# Patient Record
Sex: FEMALE | URBAN - METROPOLITAN AREA
[De-identification: names, ages, dates, MRNs, and addresses within clinical notes are randomized per-mention and may not be internally consistent; named-entity substitution may affect disease eponyms.]

---

## 2024-10-18 ENCOUNTER — TELEPHONE (OUTPATIENT)
Dept: OBSTETRICS AND GYNECOLOGY | Facility: CLINIC | Age: 74
End: 2024-10-18
Payer: MEDICARE

## 2024-10-18 NOTE — TELEPHONE ENCOUNTER
----- Message from Linn sent at 10/18/2024 11:10 AM CDT -----  Contact: Jyothi Roe is calling to see if the office will take her on as a new medicare patient at her age for off and on vaginal bleeding, please call her at  [phone].    Thanks  Td

## 2024-10-18 NOTE — TELEPHONE ENCOUNTER
Returned patient's call. Phone call was disconnected.     [facility] does accept patient's insurance but is currently out on medical leave and doesn't have any available appointments until January. [facility] would not be able to manage patient for PMB. Patient would need an ultrasound and possible EMB, therefore would need to see a Physician.   Dr. Gallagher is not accepting new Medicare patients. Pt can try seeing Dr. Chandler here at [facility] or another provider at [facility] or [facility].

## 2024-10-18 NOTE — TELEPHONE ENCOUNTER
Called and spoke with the patient. She reports having vaginal bleeding off/on. Her PCP ordered and US - she has the report. Pt states she had a total hysterectomy and is not on hormone therapy.     Dr. Gallagher is not accepting new medicare and [facility] doesn't have anything until January.   I adv pt I would see if another provider would see her. Pt is aware and verbalized understanding.  Angeles Aguirre

## 2024-10-18 NOTE — TELEPHONE ENCOUNTER
----- Message from Maryann sent at 10/18/2024 12:24 PM CDT -----  Type:  Patient Returning Call    Who Called:Jyothi Ghotra    Who Left Message for Patient: Shey   Does the patient know what this is regarding?:Abdulkadirf appt   Would the patient rather a call back or a response via [facility]?    Best Call Back Number:[phone]    Additional Information: RTc

## 2024-10-23 ENCOUNTER — OFFICE VISIT (OUTPATIENT)
Dept: OBSTETRICS AND GYNECOLOGY | Facility: CLINIC | Age: 74
End: 2024-10-23
Payer: MEDICARE

## 2024-10-23 VITALS — SYSTOLIC BLOOD PRESSURE: 135 MMHG | HEART RATE: 64 BPM | WEIGHT: 163.19 LBS | DIASTOLIC BLOOD PRESSURE: 81 MMHG

## 2024-10-23 DIAGNOSIS — Z12.4 SCREENING FOR MALIGNANT NEOPLASM OF THE CERVIX: Primary | ICD-10-CM

## 2024-10-23 DIAGNOSIS — N95.0 POSTMENOPAUSAL BLEEDING: ICD-10-CM

## 2024-10-23 DIAGNOSIS — N89.5 VAGINAL STENOSIS: ICD-10-CM

## 2024-10-23 PROCEDURE — 3075F SYST BP GE 130 - 139MM HG: CPT | Mod: CPTII,,, | Performed by: OBSTETRICS & GYNECOLOGY

## 2024-10-23 PROCEDURE — 99202 OFFICE O/P NEW SF 15 MIN: CPT | Mod: S$PBB,,, | Performed by: OBSTETRICS & GYNECOLOGY

## 2024-10-23 PROCEDURE — 3079F DIAST BP 80-89 MM HG: CPT | Mod: CPTII,,, | Performed by: OBSTETRICS & GYNECOLOGY

## 2024-10-23 PROCEDURE — 4010F ACE/ARB THERAPY RXD/TAKEN: CPT | Mod: CPTII,,, | Performed by: OBSTETRICS & GYNECOLOGY

## 2024-10-23 RX ORDER — CARVEDILOL 6.25 MG/1
6.25 TABLET ORAL
COMMUNITY

## 2024-10-23 RX ORDER — ERGOCALCIFEROL 1.25 MG/1
CAPSULE ORAL
COMMUNITY

## 2024-10-23 RX ORDER — DICLOFENAC SODIUM 75 MG/1
TABLET, DELAYED RELEASE ORAL
COMMUNITY

## 2024-10-23 RX ORDER — HYDROCHLOROTHIAZIDE 25 MG/1
1 TABLET ORAL EVERY MORNING
COMMUNITY

## 2024-10-23 RX ORDER — CEPHALEXIN 500 MG/1
500 CAPSULE ORAL
COMMUNITY
Start: 2024-10-21 | End: 2024-10-26

## 2024-10-23 RX ORDER — OXYBUTYNIN CHLORIDE 5 MG/1
TABLET ORAL
COMMUNITY

## 2024-10-23 RX ORDER — GABAPENTIN 100 MG/1
100 CAPSULE ORAL
COMMUNITY

## 2024-10-23 RX ORDER — FAMOTIDINE 40 MG/1
1 TABLET, FILM COATED ORAL 2 TIMES DAILY
COMMUNITY
Start: 2024-10-21

## 2024-10-23 RX ORDER — FLUTICASONE PROPIONATE 50 MCG
SPRAY, SUSPENSION (ML) NASAL
COMMUNITY
Start: 2024-10-21

## 2024-10-23 RX ORDER — CITALOPRAM 40 MG/1
1 TABLET, FILM COATED ORAL EVERY MORNING
COMMUNITY

## 2024-10-23 RX ORDER — POTASSIUM CHLORIDE 1500 MG/1
20 TABLET, EXTENDED RELEASE ORAL
COMMUNITY
Start: 2024-07-26

## 2024-10-23 RX ORDER — CLOPIDOGREL BISULFATE 75 MG/1
1 TABLET ORAL EVERY MORNING
COMMUNITY

## 2024-10-23 NOTE — PROGRESS NOTES
S a 74-year-old female with a history of postmenopausal bleeding said she has had bleeding often on for years sometimes this spotting sometimes is little clots by history she has had a complete hysterectomy said tubes and ovaries for pelvic pain periods .She relates no history of an abnormal Pap.  She has had 2 colonoscopies in the past several years.\She is not sure what the last one was for.  On examination the vulva is normal the upper vaginal vault is stenosed and very friable and firm bimanual does feel like this is possible mass in his area periods this is very worrisome for a vaginal carcinoma.  Will await the Pap smear and I think she will probably need to be seen by gyn Oncology periods did spend 15 minutes discussing the patient's history and examined the patient

## 2024-10-31 ENCOUNTER — TELEPHONE (OUTPATIENT)
Dept: OBSTETRICS AND GYNECOLOGY | Facility: CLINIC | Age: 74
End: 2024-10-31
Payer: MEDICARE

## 2024-10-31 DIAGNOSIS — Z12.31 OTHER SCREENING MAMMOGRAM: Primary | ICD-10-CM

## 2024-11-27 ENCOUNTER — TELEPHONE (OUTPATIENT)
Dept: GYNECOLOGIC ONCOLOGY | Facility: CLINIC | Age: 74
End: 2024-11-27
Payer: MEDICARE

## 2024-11-27 ENCOUNTER — OFFICE VISIT (OUTPATIENT)
Dept: OBSTETRICS AND GYNECOLOGY | Facility: CLINIC | Age: 74
End: 2024-11-27
Payer: MEDICARE

## 2024-11-27 VITALS — HEART RATE: 64 BPM | DIASTOLIC BLOOD PRESSURE: 82 MMHG | WEIGHT: 166.19 LBS | SYSTOLIC BLOOD PRESSURE: 132 MMHG

## 2024-11-27 DIAGNOSIS — N95.0 POSTMENOPAUSAL BLEEDING: Primary | ICD-10-CM

## 2024-11-27 PROCEDURE — 1159F MED LIST DOCD IN RCRD: CPT | Mod: CPTII,,, | Performed by: OBSTETRICS & GYNECOLOGY

## 2024-11-27 PROCEDURE — 3079F DIAST BP 80-89 MM HG: CPT | Mod: CPTII,,, | Performed by: OBSTETRICS & GYNECOLOGY

## 2024-11-27 PROCEDURE — 4010F ACE/ARB THERAPY RXD/TAKEN: CPT | Mod: CPTII,,, | Performed by: OBSTETRICS & GYNECOLOGY

## 2024-11-27 PROCEDURE — 3075F SYST BP GE 130 - 139MM HG: CPT | Mod: CPTII,,, | Performed by: OBSTETRICS & GYNECOLOGY

## 2024-11-27 PROCEDURE — 99213 OFFICE O/P EST LOW 20 MIN: CPT | Mod: S$PBB,,, | Performed by: OBSTETRICS & GYNECOLOGY

## 2024-11-27 NOTE — NURSING
New referral received from Dr Chandler for a second opinion regarding pt postmenopausal bleeding with GYN/ONC. LVM with for a call back to arrange new pt appointment. Direct navigator phone number provided to pt [phone]    Oncology Navigation   Intake  Cancer Type: Gynecologic (second opinion on PMB)  Type of Referral: Internal (Dr Wilmer Chandler)  Date of Referral: 11/27/24  Initial Nurse Navigator Contact: 11/27/24  Referral to Initial Contact Timeline (days): 0  Reason if booked > 7 days after scheduling: Transportation coordination; Specific provider / access; Patient request     Treatment  Current Status: Staging work-up     Providers:  PCP- Dr Reji Cuenca  OBGYN- Dr Wilmer Chandler  Pulmonary- Dr Moises Donahue  Cardiology- Dr Perales/ Summer Hooks FNP    Medical History:  BMI= 33.37  Restrictive lung disease  CAD- prior stents x2  Supplemental O2 during sleep  HTN  Hysterectomy- complete tubes and ovaries for painful periods  Hypothyroidism  GERD  Overactive bladder  Restless leg syndrome    10/23/24- new pt visit with Dr Chandler. PMB for years. Vulva is normal the upper vaginal vault is stenosed and very friable and firm bimanual does feel like this is possible mass in this area. This is very worrisome for a vaginal carcinoma. Will await the Pap smear and I think she will probably need to be seen by gyn Oncology.  Pap= atypical squamous cells of undetermined significance  PHV- negative    11/27/24- Dr Chandler visit- Vagina is atrophic however the apex is with the bleeding is coming from (the cuff) tried to cleaned it I will going to try her on some Estrace 1 g vaginally every night I will get a 2nd opinion from a gyn oncologist and we may just have to see if this will clear up. She does have some firmness in the apex around the vagina     Acuity      Follow Up  No follow-ups on file.

## 2024-11-27 NOTE — PROGRESS NOTES
Subjective     Patient ID: Jyothi Ghotra is a 74 y.o. female.    Chief Complaint:  Vaginal Bleeding      History of Present Illness  A 74-year-old female here for annual examination she has no complaints she does have a vulva lesion has not changed for 4-5 years has been followed by Dr. Ireland he felt it was benign.  Does have a history of high blood pressure reflux and bladder problems    Vaginal Bleeding  The patient's pertinent negatives include no pelvic pain or vaginal discharge. Pertinent negatives include no abdominal pain, chills, constipation, diarrhea, dysuria, fever, hematuria, nausea, rash or vomiting. There is no history of menorrhagia.     {OBG HPI BLOCKS:69485}    GYN & OB History  No LMP recorded.   Date of Last Pap: 10/30/2024    OB History    Para Term  AB Living   3             SAB IAB Ectopic Multiple Live Births           3      # Outcome Date GA Lbr Jose/2nd Weight Sex Type Anes PTL Lv   3       Vag-Spont      2       Vag-Spont      1       Vag-Spont          Review of Systems  Review of Systems   Constitutional:  Negative for chills and fever.   Respiratory:  Negative for shortness of breath.    Cardiovascular:  Negative for chest pain.   Gastrointestinal:  Negative for abdominal pain, blood in stool, constipation, diarrhea, nausea, vomiting and reflux.   Genitourinary:  Positive for vaginal bleeding. Negative for dysmenorrhea, dyspareunia, dysuria, hematuria, hot flashes, menorrhagia, menstrual problem, pelvic pain, vaginal discharge, postcoital bleeding and vaginal dryness.   Musculoskeletal:  Negative for arthralgias and joint swelling.   Integumentary:  Negative for rash, hair changes, breast mass, nipple discharge and breast skin changes.   Psychiatric/Behavioral:  Negative for depression. The patient is not nervous/anxious.    Breast: Negative for asymmetry, lump, mass, nipple discharge and skin changes         Objective   Physical Exam:    Constitutional: She appears well-developed and well-nourished. No distress.    HENT:   Head: Normocephalic and atraumatic.    Eyes: Conjunctivae and EOM are normal.    Neck: No tracheal deviation present. No thyromegaly present.    Cardiovascular:       Exam reveals no clubbing, no cyanosis and no edema.        Pulmonary/Chest: Effort normal. No respiratory distress.        Abdominal: Soft. She exhibits no distension and no mass. There is no abdominal tenderness. There is no rebound and no guarding. No hernia.     Genitourinary:    Vagina and rectum normal.      Pelvic exam was performed with patient supine.   There is no rash, tenderness, lesion or injury on the right labia. There is no rash, tenderness, lesion or injury on the left labia. Cervix is normal. Right adnexum displays no mass, no tenderness and no fullness. Left adnexum displays no mass, no tenderness and no fullness.    Genitourinary Comments: Vulva vagina bimanual normal breast exam normal does have a small little cyst off the inner vaginal wall on her right just inside the introitus apparently has not changed in years bimanual normal                  Skin: She is not diaphoretic. No cyanosis. Nails show no clubbing.             Assessment and Plan     1. Postmenopausal bleeding       ***      Plan:  ***

## 2024-11-27 NOTE — PROGRESS NOTES
A 74-year-old female who was having some postmenopausal bleeding on examination she has apex which is where the bleeding is coming from she had a complete hysterectomy which she says was just for multiple female problems she she states she had no history of abnormal Pap smears on examination the vagina is atrophic however the apex is with the bleeding is coming from (the cuff) tried to cleaned it I will going to try her on some Estrace 1 g vaginally every night I will get a 2nd opinion from a gyn oncologist and we may just have to see if this will clear up S she does have some firmness in the apex around the vagina also spent 20 minutes examined in the patient getting a history from her and explaining the reason for referral

## 2024-11-27 NOTE — NURSING
Pt contacted navigator back from missed call. Pt updated that a new referral was received from Dr Chandler for a second opinion regarding pt postmenopausal bleeding. Explained my role as nurse navigator and direct number provided. Options for GYN/ONC providers, all clinic locations and soonest availability discussed with pt. Pt scheduled to see Dr Rony Montero in the next available appointment. Patient encouraged to call for any questions or concerns about her care or appointments. Pt verbalized understanding. Date time and location of appointment reviewed with pt. Appointment letter mailed to pt.

## 2024-12-02 ENCOUNTER — TELEPHONE (OUTPATIENT)
Dept: OBSTETRICS AND GYNECOLOGY | Facility: CLINIC | Age: 74
End: 2024-12-02
Payer: MEDICARE

## 2024-12-02 NOTE — TELEPHONE ENCOUNTER
----- Message from Denita sent at 12/2/2024 12:20 PM CST -----  Contact: self  Patient is requesting a call back regarding medication being called out. Please call back at [phone]

## 2024-12-03 RX ORDER — ESTRADIOL 0.1 MG/G
1 CREAM VAGINAL DAILY
Qty: 42.5 G | Refills: 1 | Status: SHIPPED | OUTPATIENT
Start: 2024-12-03 | End: 2025-12-03

## 2024-12-11 NOTE — PROGRESS NOTES
Referring provider:   Dr Wilmer Chandler   PCP- Dr Reji Cuenca  Pulmonary- Dr Moises Donahue  Cardiology- Dr Perales/ Summer Hooks FNP  Subjective:      Patient ID: Jyothi Ghotra is a 74 y.o. female.    Chief Complaint:  NEW PATIENT - NP:Geraldine, second opinion on PMB      HPI  Here today for second opinion on PMB. Patient presented in 10/2024 with c/o PMB for several years. Pelvic exam at this time concerning for possible vaginal carcinoma with 'stenotic upper vagina, friable and firm concerning for possible mass'. Pap smear obtained at this time showed ASCUS. She was started on vaginal estrogen and referred to gyn/onc. PMHx restrictive lung dz (supp oxygen during sleep), CAD (stent x2, plavix), HTN. Prior hysterectomy-BSO  (abdominal hysterectomy 'painful periods'), cholecystectomy and appendectomy (open)     Review of Systems   Constitutional:  Negative for activity change, fatigue, fever and unexpected weight change.   Respiratory:  Negative for cough and shortness of breath.    Cardiovascular:  Negative for chest pain.   Gastrointestinal:  Negative for abdominal distention, abdominal pain, constipation and diarrhea.   Genitourinary:  Negative for difficulty urinating, dysuria, frequency, pelvic pain, urgency, vaginal bleeding and vaginal pain.   Neurological:  Negative for dizziness and weakness.       Past Medical History:   Diagnosis Date    Anxiety disorder, unspecified     Bladder incontinence     Constipation     Essential (primary) hypertension     Heart disease     High cholesterol     IBS (irritable bowel syndrome)     Rheumatoid arthritis, unspecified     Thyroid disease     Thyroid disease      Past Surgical History:   Procedure Laterality Date    BACK SURGERY      HIP ADDUCTOR TENOTOMY      hysterectomy complete      stints       TOTAL KNEE REPLACEMENT USING COMPUTER NAVIGATION       Family History   Problem Relation Name Age of Onset    Hypertension Father      Diabetes Father       Heart disease Mother      Heart disease Brother      Diabetes Brother       Social History     Socioeconomic History    Marital status: Unknown   Tobacco Use    Smoking status: Never    Smokeless tobacco: Never   Substance and Sexual Activity    Alcohol use: Never    Drug use: Never    Sexual activity: Not Currently     Social Drivers of Health     Financial Resource Strain: Low Risk  (10/30/2024)    [facility]    Overall Financial Resource Strain (CARDIA)     Difficulty of Paying Living Expenses: Not hard at all   Food Insecurity: No Food Insecurity (10/30/2024)    [facility]    Hunger Vital Sign     Worried About Running Out of Food in the Last Year: Never true     Ran Out of Food in the Last Year: Never true   Transportation Needs: No Transportation Needs (10/30/2024)    [facility]    PRAPARE - Transportation     Lack of Transportation (Medical): No     Lack of Transportation (Non-Medical): No   Physical Activity: Sufficiently Active (10/30/2024)    [facility]    Exercise Vital Sign     Days of Exercise per Week: 7 days     Minutes of Exercise per Session: 30 min   Stress: No Stress Concern Present (10/30/2024)    [facility]    [place] [employer] - Occupational Stress Questionnaire     Feeling of Stress : Only a little   Housing Stability: Low Risk  (10/30/2024)    [facility]    Housing Stability Vital Sign     Unable to Pay for Housing in the Last Year: No     Number  of Times Moved in the Last Year: 0     Homeless in the Last Year: No     Current Outpatient Medications   Medication Sig    carvediloL (COREG) 6.25 MG tablet Take 6.25 mg by mouth.    citalopram (CELEXA) 40 MG tablet Take 1 tablet by mouth every morning.    clopidogreL (PLAVIX) 75 mg tablet Take 1 tablet by mouth every morning.    diclofenac (VOLTAREN) 75 MG EC tablet     ergocalciferol (ERGOCALCIFEROL) 50,000 unit Cap     estradioL (ESTRACE) 0.01 % (0.1 mg/gram) vaginal cream Place 1 g vaginally once daily.    famotidine (PEPCID) 40 MG tablet Take 1 tablet by mouth 2 (two) times daily.    fluticasone propionate (FLONASE) 50 mcg/actuation nasal spray USE 2 SPRAYS INTO EACH NOSTRIL ONCE DAILY    gabapentin (NEURONTIN) 100 MG capsule Take 100 mg by mouth.    hydroCHLOROthiazide (HYDRODIURIL) 25 MG tablet Take 1 tablet by mouth every morning.    oxybutynin (DITROPAN) 5 MG Tab     potassium chloride (K-TAB) 20 mEq Take 20 mEq by mouth.     No current facility-administered medications for this visit.     Review of patient's allergies indicates:   Allergen Reactions    Albuterol     Oxycodone-aspirin Rash       Objective:   Physical Exam:   Constitutional: She is oriented to person, place, and time. She appears well-developed.        Pulmonary/Chest: Effort normal and breath sounds normal. No respiratory distress.        Abdominal: Soft. She exhibits no distension. There is no abdominal tenderness.     Genitourinary:    Genitourinary Comments: No palpable inguinal lymphadenopathy.  External genitalia normal. Stenotic vaginal at the upper 1/2 of vaginal vault. Agglutination gentle dilated with single digit. Tissue indurated and friable, but no maribel tumor or mass appreciated.   Biopsies performed with tischler forceps.                 Neurological: She is alert and oriented to person, place, and time.    Skin: No rash noted.    Psychiatric: She has a normal mood and affect. Judgment normal.       Assessment:     1.  Postmenopausal bleeding        Plan:       Post menopausal bleeding: Exam concerning for possible primary vaginal cancer, though no tumor or mass definitively appreciated on exam today. Tissue friable and indurated. Biopsies taken in the office today. Reviewed findings and work up and plan for obtaining diagnosis.  Follow up path  CT CAP to assess evidence of metastatic disease  If work up is inconclusive, will plan for EUA and biopsies and possibly a pelvic MRI    As part of the medical decision making process I reviewed the referring provides notes, relevant labs, imaging reports.

## 2024-12-12 ENCOUNTER — OFFICE VISIT (OUTPATIENT)
Dept: GYNECOLOGIC ONCOLOGY | Facility: CLINIC | Age: 74
End: 2024-12-12
Attending: STUDENT IN AN ORGANIZED HEALTH CARE EDUCATION/TRAINING PROGRAM
Payer: MEDICARE

## 2024-12-12 VITALS
DIASTOLIC BLOOD PRESSURE: 77 MMHG | HEIGHT: 59 IN | WEIGHT: 163.13 LBS | BODY MASS INDEX: 32.88 KG/M2 | SYSTOLIC BLOOD PRESSURE: 130 MMHG | HEART RATE: 89 BPM

## 2024-12-12 DIAGNOSIS — N95.0 POSTMENOPAUSAL BLEEDING: Primary | ICD-10-CM

## 2024-12-12 PROCEDURE — 3075F SYST BP GE 130 - 139MM HG: CPT | Mod: CPTII,S$GLB,, | Performed by: STUDENT IN AN ORGANIZED HEALTH CARE EDUCATION/TRAINING PROGRAM

## 2024-12-12 PROCEDURE — 3288F FALL RISK ASSESSMENT DOCD: CPT | Mod: CPTII,S$GLB,, | Performed by: STUDENT IN AN ORGANIZED HEALTH CARE EDUCATION/TRAINING PROGRAM

## 2024-12-12 PROCEDURE — 57100 BIOPSY VAGINAL MUCOSA SIMPLE: CPT | Mod: S$GLB,,, | Performed by: STUDENT IN AN ORGANIZED HEALTH CARE EDUCATION/TRAINING PROGRAM

## 2024-12-12 PROCEDURE — 3008F BODY MASS INDEX DOCD: CPT | Mod: CPTII,S$GLB,, | Performed by: STUDENT IN AN ORGANIZED HEALTH CARE EDUCATION/TRAINING PROGRAM

## 2024-12-12 PROCEDURE — 99214 OFFICE O/P EST MOD 30 MIN: CPT | Mod: 25,S$GLB,, | Performed by: STUDENT IN AN ORGANIZED HEALTH CARE EDUCATION/TRAINING PROGRAM

## 2024-12-12 PROCEDURE — 1101F PT FALLS ASSESS-DOCD LE1/YR: CPT | Mod: CPTII,S$GLB,, | Performed by: STUDENT IN AN ORGANIZED HEALTH CARE EDUCATION/TRAINING PROGRAM

## 2024-12-12 PROCEDURE — 3078F DIAST BP <80 MM HG: CPT | Mod: CPTII,S$GLB,, | Performed by: STUDENT IN AN ORGANIZED HEALTH CARE EDUCATION/TRAINING PROGRAM

## 2024-12-12 PROCEDURE — 4010F ACE/ARB THERAPY RXD/TAKEN: CPT | Mod: CPTII,S$GLB,, | Performed by: STUDENT IN AN ORGANIZED HEALTH CARE EDUCATION/TRAINING PROGRAM

## 2024-12-12 PROCEDURE — 1159F MED LIST DOCD IN RCRD: CPT | Mod: CPTII,S$GLB,, | Performed by: STUDENT IN AN ORGANIZED HEALTH CARE EDUCATION/TRAINING PROGRAM

## 2024-12-13 LAB — PSYCHE PATHOLOGY RESULT: NORMAL

## 2024-12-17 ENCOUNTER — HOSPITAL ENCOUNTER (OUTPATIENT)
Dept: RADIOLOGY | Facility: HOSPITAL | Age: 74
Discharge: HOME | End: 2024-12-17
Attending: STUDENT IN AN ORGANIZED HEALTH CARE EDUCATION/TRAINING PROGRAM
Payer: MEDICARE

## 2024-12-17 DIAGNOSIS — N95.0 POSTMENOPAUSAL BLEEDING: ICD-10-CM

## 2024-12-17 PROCEDURE — 25500020 PHARM REV CODE 255: Performed by: STUDENT IN AN ORGANIZED HEALTH CARE EDUCATION/TRAINING PROGRAM

## 2024-12-17 PROCEDURE — 74178 CT ABD&PLV WO CNTR FLWD CNTR: CPT | Mod: TC

## 2024-12-17 RX ADMIN — IOHEXOL 100 ML: 300 INJECTION, SOLUTION INTRAVENOUS at 10:12

## 2024-12-19 ENCOUNTER — OFFICE VISIT (OUTPATIENT)
Dept: GYNECOLOGIC ONCOLOGY | Facility: CLINIC | Age: 74
End: 2024-12-19
Payer: MEDICARE

## 2024-12-19 DIAGNOSIS — N89.8 VAGINAL LESION: Primary | ICD-10-CM

## 2024-12-19 PROCEDURE — 99024 POSTOP FOLLOW-UP VISIT: CPT | Mod: 95,,, | Performed by: STUDENT IN AN ORGANIZED HEALTH CARE EDUCATION/TRAINING PROGRAM

## 2024-12-19 PROCEDURE — 4010F ACE/ARB THERAPY RXD/TAKEN: CPT | Mod: CPTII,95,, | Performed by: STUDENT IN AN ORGANIZED HEALTH CARE EDUCATION/TRAINING PROGRAM

## 2024-12-19 NOTE — PROGRESS NOTES
Audio visit performed.     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 CT without comment on abnormality in the pelvis.     Given limited exam in the office, plan for EUA and biopsies for further evaluation on 2/12 in [place].     MRI pelvis to further assess pelvic structures and assess extent of vaginal lesion.    If above work up does not reveal evidence of cancer, will continue medical management of vaginal bleeding s/p hysterectomy.    Rony Montero

## 2024-12-20 ENCOUNTER — TELEPHONE (OUTPATIENT)
Dept: GYNECOLOGIC ONCOLOGY | Facility: CLINIC | Age: 74
End: 2024-12-20
Payer: MEDICARE

## 2024-12-20 DIAGNOSIS — Z01.818 PRE-OP TESTING: ICD-10-CM

## 2024-12-20 DIAGNOSIS — N89.8 VAGINAL LESION: Primary | ICD-10-CM

## 2024-12-20 NOTE — TELEPHONE ENCOUNTER
Pt and daughter (Swapna) contacted to follow up from virtual visit with Dr Montero yesterday. Pt scheduled for MRI and scheduled for surgery Wednesday February 12th in [place] with Dr Rony Montero. Pt agreed to surgical date. Pt updated that the pre-op team would contact her the week prior to arrange any necessary pre-operative testing. Then on Tuesday February 11th she would be contacted and provided with a surgical arrival time. Will discuss post operative appointment timing with Dr Montero. Pt and Daughter updated that he would call with results. Pt verbalized understanding of all appointments. Pt encouraged to call for any concerns or questions.

## 2025-01-27 ENCOUNTER — HOSPITAL ENCOUNTER (OUTPATIENT)
Dept: RADIOLOGY | Facility: HOSPITAL | Age: 75
Discharge: HOME | End: 2025-01-27
Attending: STUDENT IN AN ORGANIZED HEALTH CARE EDUCATION/TRAINING PROGRAM
Payer: MEDICARE

## 2025-01-27 DIAGNOSIS — N89.8 VAGINAL LESION: ICD-10-CM

## 2025-01-27 PROCEDURE — 25500020 PHARM REV CODE 255: Performed by: STUDENT IN AN ORGANIZED HEALTH CARE EDUCATION/TRAINING PROGRAM

## 2025-01-27 PROCEDURE — 72197 MRI PELVIS W/O & W/DYE: CPT | Mod: TC

## 2025-01-27 PROCEDURE — A9577 INJ MULTIHANCE: HCPCS | Performed by: STUDENT IN AN ORGANIZED HEALTH CARE EDUCATION/TRAINING PROGRAM

## 2025-01-27 RX ADMIN — GADOBENATE DIMEGLUMINE 15 ML: 529 INJECTION, SOLUTION INTRAVENOUS at 11:01

## 2025-01-30 ENCOUNTER — TELEPHONE (OUTPATIENT)
Dept: GYNECOLOGIC ONCOLOGY | Facility: CLINIC | Age: 75
End: 2025-01-30
Payer: MEDICARE

## 2025-01-30 NOTE — TELEPHONE ENCOUNTER
Called Jyothi and spoke with her daughter, Swapna. Explained MRI findings with no evidence of vaginal mass. Discussed options of proceeding with EUA and biopsies as scheduled vs cancelling the procedure given no imaging findings concerning for malignancy. Swapna relays that Jyothi's symptoms have resolved and she has not continued to have vaginal bleeding and would like to cancel the surgery. All questions answered.    Rony Montero MD  Gynecologic oncology

## 2025-01-30 NOTE — TELEPHONE ENCOUNTER
Navigator called [facility] surgery and [facility] to cancel pt surgery.

## 2025-01-30 NOTE — TELEPHONE ENCOUNTER
----- Message from Rony Montero MD sent at 1/30/2025 12:39 PM CST -----  I called her and spoke to her daughter. Given no mass concerning for malignancy and reports that her bleeding symptoms have resolved, she would like to cancel the case. She can follow up with GYN.  ----- Message -----  From: Ness Cuenca PA-C  Sent: 1/30/2025  12:25 PM CST  To: Rony Montero MD    This is our patient with PMB and vaginal lesion with inconclusive biopsy in clinic. CT showed she has no uterus or ovaries. MRI also without abnormalities or mass near vaginal cuff... last step I guess is our EUA in a couple weeks!

## 2025-05-20 RX ORDER — ESTRADIOL 0.1 MG/G
1 CREAM VAGINAL DAILY
Qty: 42.5 G | Refills: 1 | Status: SHIPPED | OUTPATIENT
Start: 2025-05-20 | End: 2026-05-20